# Patient Record
Sex: MALE | Race: WHITE | NOT HISPANIC OR LATINO | URBAN - METROPOLITAN AREA
[De-identification: names, ages, dates, MRNs, and addresses within clinical notes are randomized per-mention and may not be internally consistent; named-entity substitution may affect disease eponyms.]

---

## 2021-09-01 ENCOUNTER — OFFICE VISIT (OUTPATIENT)
Dept: URBAN - METROPOLITAN AREA CLINIC 27 | Facility: CLINIC | Age: 71
End: 2021-09-01

## 2021-09-01 PROBLEM — 275978004 SCREENING FOR MALIGNANT NEOPLASM OF COLON: Status: ACTIVE | Noted: 2021-09-01

## 2021-09-01 PROBLEM — 59621000 ESSENTIAL HYPERTENSION: Status: ACTIVE | Noted: 2021-09-01

## 2021-09-28 ENCOUNTER — OFFICE VISIT (OUTPATIENT)
Dept: URBAN - METROPOLITAN AREA SURGERY CENTER 7 | Facility: SURGERY CENTER | Age: 71
End: 2021-09-28

## 2022-04-30 ENCOUNTER — TELEPHONE ENCOUNTER (OUTPATIENT)
Dept: URBAN - METROPOLITAN AREA CLINIC 121 | Facility: CLINIC | Age: 72
End: 2022-04-30

## 2022-04-30 RX ORDER — INDOMETHACIN 25 MG/1
CAPSULE ORAL
OUTPATIENT
Start: 2016-08-18

## 2022-04-30 RX ORDER — METFORMIN HCL 1000 MG/1
QD TABLET ORAL
OUTPATIENT
Start: 2011-05-26

## 2022-04-30 RX ORDER — AMLODIPINE BESYLATE 10 MG/1
TABLET ORAL
OUTPATIENT
Start: 2016-08-18

## 2022-04-30 RX ORDER — VARDENAFIL HCL 20 MG
TABLET ORAL
OUTPATIENT
Start: 2006-03-20 | End: 2011-05-26

## 2022-04-30 RX ORDER — POLYETHYLENE GLYCOL-3350 AND ELECTROLYTES 236; 6.74; 5.86; 2.97; 22.74 G/274.31G; G/274.31G; G/274.31G; G/274.31G; G/274.31G
TAKE 1 ML BY MOUTH SINGLE DOSE MIX AND USE AS DIRECTED POWDER, FOR SOLUTION ORAL
OUTPATIENT
Start: 2021-09-02 | End: 2021-09-03

## 2022-04-30 RX ORDER — POLYETHYLENE GLYCOL-3350 AND ELECTROLYTES WITH FLAVOR PACK 240; 5.84; 2.98; 6.72; 22.72 G/278.26G; G/278.26G; G/278.26G; G/278.26G; G/278.26G
MIX AND USE AS DIRECTED POWDER, FOR SOLUTION ORAL
OUTPATIENT
Start: 2016-08-18

## 2022-04-30 RX ORDER — VARDENAFIL HCL 20 MG
TABLET ORAL
OUTPATIENT
Start: 2006-03-20

## 2022-04-30 RX ORDER — POLYETHYLENE GLYCOL-3350 AND ELECTROLYTES WITH FLAVOR PACK 240; 5.84; 2.98; 6.72; 22.72 G/278.26G; G/278.26G; G/278.26G; G/278.26G; G/278.26G
DISSOLVE AS DIRECTED POWDER, FOR SOLUTION ORAL
OUTPATIENT
Start: 2016-08-18 | End: 2021-10-01

## 2022-04-30 RX ORDER — VARDENAFIL HCL 20 MG
TABLET ORAL
OUTPATIENT
Start: 2016-08-18

## 2022-04-30 RX ORDER — METFORMIN HCL 1000 MG/1
TABLET ORAL
OUTPATIENT
Start: 2006-03-20

## 2022-05-01 ENCOUNTER — TELEPHONE ENCOUNTER (OUTPATIENT)
Dept: URBAN - METROPOLITAN AREA CLINIC 121 | Facility: CLINIC | Age: 72
End: 2022-05-01

## 2022-05-01 RX ORDER — METFORMIN HCL 1000 MG/1
ONCE A DAY TABLET ORAL
Status: ACTIVE | COMMUNITY
Start: 2011-05-26

## 2022-05-01 RX ORDER — MULTIVITAMIN
TABLET ORAL
Status: ACTIVE | COMMUNITY
Start: 2006-03-20

## 2022-05-01 RX ORDER — ASPIRIN 81 MG/1
TABLET ORAL
Status: ACTIVE | COMMUNITY
Start: 2006-03-20

## 2022-05-01 RX ORDER — AMLODIPINE BESYLATE 10 MG/1
TABLET ORAL
Status: ACTIVE | COMMUNITY
Start: 2016-08-18

## 2022-05-01 RX ORDER — INDOMETHACIN 25 MG/1
CAPSULE ORAL
Status: ACTIVE | COMMUNITY
Start: 2016-08-18

## 2022-05-01 RX ORDER — VARDENAFIL HCL 20 MG
TABLET ORAL
Status: ACTIVE | COMMUNITY
Start: 2016-08-18

## 2024-08-30 ENCOUNTER — TELEPHONE ENCOUNTER (OUTPATIENT)
Dept: URBAN - METROPOLITAN AREA CLINIC 27 | Facility: CLINIC | Age: 74
End: 2024-08-30

## 2024-09-02 ENCOUNTER — WEB ENCOUNTER (OUTPATIENT)
Dept: URBAN - METROPOLITAN AREA CLINIC 27 | Facility: CLINIC | Age: 74
End: 2024-09-02

## 2024-09-04 ENCOUNTER — LAB OUTSIDE AN ENCOUNTER (OUTPATIENT)
Dept: URBAN - METROPOLITAN AREA CLINIC 27 | Facility: CLINIC | Age: 74
End: 2024-09-04

## 2024-09-10 LAB
CLOSTRIDIUM DIFFICILE: (no result)
FECAL FAT, QUALITATIVE: NORMAL
GIARDIA AG, EIA, STOOL: (no result)
LEUKOCYTES: (no result)
OVA AND PARASITES, CONC AND PERM SMEAR: (no result)
SALMONELLA AND SHIGELLA, CULTURE: (no result)
SHIGA TOXINS, EIA W/RFL TO E.COLI O157 CULTURE: (no result)

## 2024-09-17 ENCOUNTER — DASHBOARD ENCOUNTERS (OUTPATIENT)
Age: 74
End: 2024-09-17

## 2024-09-17 ENCOUNTER — OFFICE VISIT (OUTPATIENT)
Dept: URBAN - METROPOLITAN AREA CLINIC 27 | Facility: CLINIC | Age: 74
End: 2024-09-17
Payer: MEDICARE

## 2024-09-17 VITALS
SYSTOLIC BLOOD PRESSURE: 144 MMHG | HEART RATE: 84 BPM | BODY MASS INDEX: 27.15 KG/M2 | HEIGHT: 67 IN | WEIGHT: 173 LBS | DIASTOLIC BLOOD PRESSURE: 71 MMHG

## 2024-09-17 DIAGNOSIS — E73.8 OTHER LACTOSE INTOLERANCE: ICD-10-CM

## 2024-09-17 DIAGNOSIS — I10 HYPERTENSION: ICD-10-CM

## 2024-09-17 DIAGNOSIS — R19.7 DIARRHEA: ICD-10-CM

## 2024-09-17 DIAGNOSIS — Z79.02 LONG TERM (CURRENT) USE OF ANTITHROMBOTICS/ANTIPLATELETS: ICD-10-CM

## 2024-09-17 DIAGNOSIS — E11.9 DIABETES MELLITUS: ICD-10-CM

## 2024-09-17 DIAGNOSIS — Z86.010 PERSONAL HISTORY OF COLONIC POLYPS: ICD-10-CM

## 2024-09-17 PROCEDURE — 99204 OFFICE O/P NEW MOD 45 MIN: CPT | Performed by: INTERNAL MEDICINE

## 2024-09-17 RX ORDER — METFORMIN HCL 1000 MG/1
ONCE A DAY TABLET ORAL
Status: ACTIVE | COMMUNITY
Start: 2011-05-26

## 2024-09-17 RX ORDER — INDOMETHACIN 25 MG/1
CAPSULE ORAL
Status: ON HOLD | COMMUNITY
Start: 2016-08-18

## 2024-09-17 RX ORDER — ASPIRIN 81 MG/1
TABLET ORAL
Status: ON HOLD | COMMUNITY
Start: 2006-03-20

## 2024-09-17 RX ORDER — AMLODIPINE BESYLATE 10 MG/1
TABLET ORAL
Status: DISCONTINUED | COMMUNITY
Start: 2016-08-18

## 2024-09-17 RX ORDER — MULTIVITAMIN
TABLET ORAL
Status: ACTIVE | COMMUNITY
Start: 2006-03-20

## 2024-09-17 RX ORDER — VARDENAFIL HCL 20 MG
TABLET ORAL
Status: ON HOLD | COMMUNITY
Start: 2016-08-18

## 2024-09-17 NOTE — HPI-TODAY'S VISIT:
Patient here with complaints of diarrhea that has been present since January, but which has definitely worsened over the past month.  The diarrhea is often explosive but not bloody.  It is occasionally nocturnal.  He denies any triggers for the diarrhea aside from dairy.  Imodium and Lomotil are both helpful.  He currently has 2-3 stools per day.  He has no other GI symptoms currently.  He is a diabetic and his most recent hemoglobin A1c is 7.2%.  He last underwent colonoscopy in 2021; no polyps though the prep was suboptimal.  The exam was very difficult due to severe looping and the suboptimal prep.  He has a history of colon polyps >10 years ago.  Stool studies earlier this month were negative.  There is no family history of colon cancer, polyps, or IBD.  He has a history of atrial flutter for which he underwent ablation in 2022. He thinks that recent labs were unremarkable.

## 2024-09-18 ENCOUNTER — LAB OUTSIDE AN ENCOUNTER (OUTPATIENT)
Dept: URBAN - METROPOLITAN AREA CLINIC 27 | Facility: CLINIC | Age: 74
End: 2024-09-18

## 2024-09-19 ENCOUNTER — OFFICE VISIT (OUTPATIENT)
Dept: URBAN - METROPOLITAN AREA SURGERY CENTER 7 | Facility: SURGERY CENTER | Age: 74
End: 2024-09-19
Payer: MEDICARE

## 2024-09-19 ENCOUNTER — TELEPHONE ENCOUNTER (OUTPATIENT)
Dept: URBAN - METROPOLITAN AREA CLINIC 27 | Facility: CLINIC | Age: 74
End: 2024-09-19

## 2024-09-19 ENCOUNTER — CLAIMS CREATED FROM THE CLAIM WINDOW (OUTPATIENT)
Dept: URBAN - METROPOLITAN AREA CLINIC 4 | Facility: CLINIC | Age: 74
End: 2024-09-19
Payer: MEDICARE

## 2024-09-19 DIAGNOSIS — K52.832 LYMPHOCYTIC  COLITIS: ICD-10-CM

## 2024-09-19 DIAGNOSIS — K52.832 LYMPHOCYTIC COLITIS: ICD-10-CM

## 2024-09-19 PROCEDURE — 88305 TISSUE EXAM BY PATHOLOGIST: CPT | Performed by: PATHOLOGY

## 2024-09-19 PROCEDURE — 88342 IMHCHEM/IMCYTCHM 1ST ANTB: CPT | Performed by: PATHOLOGY

## 2024-09-19 PROCEDURE — 45331 SIGMOIDOSCOPY AND BIOPSY: CPT | Performed by: CLINIC/CENTER

## 2024-09-19 PROCEDURE — 88313 SPECIAL STAINS GROUP 2: CPT | Performed by: PATHOLOGY

## 2024-09-19 PROCEDURE — 45331 SIGMOIDOSCOPY AND BIOPSY: CPT | Performed by: INTERNAL MEDICINE

## 2024-09-19 RX ORDER — ASPIRIN 81 MG/1
TABLET ORAL
Status: ON HOLD | COMMUNITY
Start: 2006-03-20

## 2024-09-19 RX ORDER — METFORMIN HCL 1000 MG/1
ONCE A DAY TABLET ORAL
Status: ACTIVE | COMMUNITY
Start: 2011-05-26

## 2024-09-19 RX ORDER — MULTIVITAMIN
TABLET ORAL
Status: ACTIVE | COMMUNITY
Start: 2006-03-20

## 2024-09-19 RX ORDER — INDOMETHACIN 25 MG/1
CAPSULE ORAL
Status: ON HOLD | COMMUNITY
Start: 2016-08-18

## 2024-09-19 RX ORDER — VARDENAFIL HCL 20 MG
TABLET ORAL
Status: ON HOLD | COMMUNITY
Start: 2016-08-18

## 2024-09-26 ENCOUNTER — TELEPHONE ENCOUNTER (OUTPATIENT)
Dept: URBAN - METROPOLITAN AREA CLINIC 27 | Facility: CLINIC | Age: 74
End: 2024-09-26

## 2024-09-26 RX ORDER — BUDESONIDE 3 MG/1
3 CAPSULES CAPSULE, DELAYED RELEASE PELLETS ORAL ONCE A DAY
Qty: 90 CAPSULE | Refills: 3 | OUTPATIENT
Start: 2024-09-26

## 2024-10-11 ENCOUNTER — WEB ENCOUNTER (OUTPATIENT)
Dept: URBAN - METROPOLITAN AREA CLINIC 27 | Facility: CLINIC | Age: 74
End: 2024-10-11

## 2025-07-02 ENCOUNTER — TELEPHONE ENCOUNTER (OUTPATIENT)
Dept: URBAN - METROPOLITAN AREA CLINIC 27 | Facility: CLINIC | Age: 75
End: 2025-07-02

## 2025-07-02 ENCOUNTER — OFFICE VISIT (OUTPATIENT)
Dept: URBAN - METROPOLITAN AREA CLINIC 27 | Facility: CLINIC | Age: 75
End: 2025-07-02
Payer: MEDICARE

## 2025-07-02 DIAGNOSIS — R19.7 DIARRHEA: ICD-10-CM

## 2025-07-02 DIAGNOSIS — E11.9 DIABETES MELLITUS: ICD-10-CM

## 2025-07-02 DIAGNOSIS — Z86.0100 HISTORY OF COLON POLYPS: ICD-10-CM

## 2025-07-02 DIAGNOSIS — K52.832 LYMPHOCYTIC COLITIS: ICD-10-CM

## 2025-07-02 PROCEDURE — 99213 OFFICE O/P EST LOW 20 MIN: CPT | Performed by: INTERNAL MEDICINE

## 2025-07-02 RX ORDER — METFORMIN HCL 1000 MG/1
ONCE A DAY TABLET ORAL
Status: ACTIVE | COMMUNITY
Start: 2011-05-26

## 2025-07-02 RX ORDER — BUDESONIDE 3 MG/1
3 CAPSULES CAPSULE, DELAYED RELEASE PELLETS ORAL ONCE A DAY
Qty: 90 CAPSULE | Refills: 3 | Status: ACTIVE | COMMUNITY
Start: 2024-09-26

## 2025-07-02 RX ORDER — INDOMETHACIN 25 MG/1
CAPSULE ORAL
Status: ON HOLD | COMMUNITY
Start: 2016-08-18

## 2025-07-02 RX ORDER — ASPIRIN 81 MG/1
TABLET ORAL
Status: ON HOLD | COMMUNITY
Start: 2006-03-20

## 2025-07-02 RX ORDER — MULTIVITAMIN
TABLET ORAL
Status: ACTIVE | COMMUNITY
Start: 2006-03-20

## 2025-07-02 RX ORDER — VARDENAFIL HCL 20 MG
TABLET ORAL
Status: ON HOLD | COMMUNITY
Start: 2016-08-18

## 2025-07-02 RX ORDER — BUDESONIDE 3 MG/1
3 CAPSULES CAPSULE, DELAYED RELEASE PELLETS ORAL ONCE A DAY
Qty: 90 | Refills: 3
Start: 2024-09-26

## 2025-07-02 NOTE — HPI-TODAY'S VISIT:
Patient here for GI follow-up of diarrhea >6mo. He underwent flexible sigmoidoscopy last September. Medium-sized internal hemorrhoids were seen. Mild/moderate erythema was present in the entire portion of the examined colon, though the mucosa appeared otherwise unremarkable. Random biopsies from the left colon showed lymphocytic colitis. He was placed on budesonide 9 mg once daily following that exam, and is now here in follow-up. He took the budesonide for 3 months and then stopped because "my prescription ran out". His diarrhea has actually resolved. He currently has 1 formed stool per day. He does not have any rectal bleeding or abdominal pain. He notes that spicy foods will trigger symptoms. He takes a probiotic occasionally. He does not take a fiber supplement but does have adequate fiber in his diet. He has no other GI symptoms currently. He has had suboptimally-controlled diabetes recently, and apparently will soon begin Mounjaro. Stool studies last fall were negative .He last underwent colonoscopy in 2021; no polyps though the prep was suboptimal. The exam was very difficult due to severe looping and the suboptimal prep. He was hospitalized soon after the colonoscopy with significant electrolyte abnormalities, including a sodium of 111, chloride 78, potassium 6.3, creatinine of 7.4; his electrolytes improved with supplementation and IV fluids. There is no family history of colon cancer, polyps, or IBD.  He has a history of atrial flutter for which he underwent ablation in 2022. He thinks that recent labs were unremarkable.

## 2025-07-17 ENCOUNTER — WEB ENCOUNTER (OUTPATIENT)
Dept: URBAN - METROPOLITAN AREA CLINIC 27 | Facility: CLINIC | Age: 75
End: 2025-07-17

## 2025-08-25 ENCOUNTER — WEB ENCOUNTER (OUTPATIENT)
Dept: URBAN - METROPOLITAN AREA CLINIC 27 | Facility: CLINIC | Age: 75
End: 2025-08-25

## 2025-08-27 ENCOUNTER — WEB ENCOUNTER (OUTPATIENT)
Dept: URBAN - METROPOLITAN AREA CLINIC 27 | Facility: CLINIC | Age: 75
End: 2025-08-27